# Patient Record
Sex: MALE | Race: WHITE
[De-identification: names, ages, dates, MRNs, and addresses within clinical notes are randomized per-mention and may not be internally consistent; named-entity substitution may affect disease eponyms.]

---

## 2020-06-16 ENCOUNTER — HOSPITAL ENCOUNTER (EMERGENCY)
Dept: HOSPITAL 18 - NAV ERS | Age: 70
Discharge: HOME | End: 2020-06-16
Payer: MEDICARE

## 2020-06-16 DIAGNOSIS — K57.32: Primary | ICD-10-CM

## 2020-06-16 DIAGNOSIS — I11.0: ICD-10-CM

## 2020-06-16 DIAGNOSIS — J44.9: ICD-10-CM

## 2020-06-16 DIAGNOSIS — E78.5: ICD-10-CM

## 2020-06-16 DIAGNOSIS — Z87.891: ICD-10-CM

## 2020-06-16 DIAGNOSIS — Z79.51: ICD-10-CM

## 2020-06-16 DIAGNOSIS — E78.00: ICD-10-CM

## 2020-06-16 DIAGNOSIS — I50.9: ICD-10-CM

## 2020-06-16 DIAGNOSIS — I25.10: ICD-10-CM

## 2020-06-16 DIAGNOSIS — Z79.899: ICD-10-CM

## 2020-06-16 LAB
ALBUMIN SERPL BCG-MCNC: 4.5 G/DL (ref 3.4–4.8)
ALP SERPL-CCNC: 92 U/L (ref 40–110)
ALT SERPL W P-5'-P-CCNC: 20 U/L (ref 8–55)
ANION GAP SERPL CALC-SCNC: 16 MMOL/L (ref 10–20)
AST SERPL-CCNC: 17 U/L (ref 5–34)
BASOPHILS # BLD AUTO: 0.1 THOU/UL (ref 0–0.2)
BASOPHILS NFR BLD AUTO: 0.9 % (ref 0–1)
BILIRUB SERPL-MCNC: 0.4 MG/DL (ref 0.2–1.2)
BUN SERPL-MCNC: 27 MG/DL (ref 8.4–25.7)
CALCIUM SERPL-MCNC: 9.5 MG/DL (ref 7.8–10.44)
CHLORIDE SERPL-SCNC: 105 MMOL/L (ref 98–107)
CO2 SERPL-SCNC: 24 MMOL/L (ref 23–31)
CREAT CL PREDICTED SERPL C-G-VRATE: 0 ML/MIN (ref 70–130)
EOSINOPHIL # BLD AUTO: 0.3 THOU/UL (ref 0–0.7)
EOSINOPHIL NFR BLD AUTO: 1.7 % (ref 0–10)
GLOBULIN SER CALC-MCNC: 3.1 G/DL (ref 2.4–3.5)
GLUCOSE SERPL-MCNC: 123 MG/DL (ref 80–115)
HGB BLD-MCNC: 12.1 G/DL (ref 14–18)
LYMPHOCYTES # BLD AUTO: 2.6 THOU/UL (ref 1.2–3.4)
LYMPHOCYTES NFR BLD AUTO: 16.1 % (ref 21–51)
MCH RBC QN AUTO: 30.4 PG (ref 27–31)
MCV RBC AUTO: 94.4 FL (ref 78–98)
MONOCYTES # BLD AUTO: 0.9 THOU/UL (ref 0.11–0.59)
MONOCYTES NFR BLD AUTO: 5.4 % (ref 0–10)
NEUTROPHILS # BLD AUTO: 12.2 THOU/UL (ref 1.4–6.5)
NEUTROPHILS NFR BLD AUTO: 75.9 % (ref 42–75)
PLATELET # BLD AUTO: 345 THOU/UL (ref 130–400)
POTASSIUM SERPL-SCNC: 5.2 MMOL/L (ref 3.5–5.1)
RBC # BLD AUTO: 3.97 MILL/UL (ref 4.7–6.1)
SODIUM SERPL-SCNC: 140 MMOL/L (ref 136–145)
WBC # BLD AUTO: 16.1 THOU/UL (ref 4.8–10.8)

## 2020-06-16 PROCEDURE — 85025 COMPLETE CBC W/AUTO DIFF WBC: CPT

## 2020-06-16 PROCEDURE — 80053 COMPREHEN METABOLIC PANEL: CPT

## 2020-06-16 PROCEDURE — 74177 CT ABD & PELVIS W/CONTRAST: CPT

## 2020-06-16 PROCEDURE — 96374 THER/PROPH/DIAG INJ IV PUSH: CPT

## 2020-06-16 PROCEDURE — 96375 TX/PRO/DX INJ NEW DRUG ADDON: CPT

## 2020-06-16 PROCEDURE — 96376 TX/PRO/DX INJ SAME DRUG ADON: CPT

## 2020-06-16 PROCEDURE — 83605 ASSAY OF LACTIC ACID: CPT

## 2020-06-16 PROCEDURE — 81003 URINALYSIS AUTO W/O SCOPE: CPT

## 2020-06-16 NOTE — CT
CT ABDOMEN AND PELVIS WITH IV CONTRAST

 6/16/2020



CLINICAL INFORMATION:

Lower abdominal pain which started 3 days ago. Pain radiates to low back.



COMPARISON:

 None.



Technique:

Multiple contiguous axial CT images are obtained through the abdomen and pelvis with IV contrast. Cor
onal reformatted images are provided.



FINDINGS:



Lower Chest: Lung bases are clear.

Vessels: Vascular calcifications are seen in the coronary arteries as well as involving the abdominal
 aorta and iliac arteries. Infrarenal abdominal aorta is ectatic measuring 2.9 cm in diameter. 



Abdomen:

Portal vein:Patent

Gallbladder: Within normal limits for CT imaging.

Liver: Subcentimeter too small to characterize hypodense lesions are seen in the left hepatic lobe. L
argest lobulated hypodense lesion near the dome of the liver measures 2.1 cm and is likely related

to a cyst.

Spleen: within normal limits.

Pancreas: within normal limits.

Adrenals: within normal limits.

Kidneys: Subcentimeter too small to characterize hypodense lesion is seen in the midportion right kid
royce. There is an exophytic hypodense lesion midportion left kidney measuring 1.6 cm demonstrating

fluid attenuation suggestive of a cyst.



Bowel: There is colonic diverticulosis. There is wall thickening and adjacent pericolonic inflammator
y changes at the junction of the descending and proximal sigmoid colon most compatible with

diverticulitis. No definite free intraperitoneal gas is present. There is no fluid collection seen to
 suggest an abscess.

Appendix: The appendix is visualized and normal in caliber.





Peritoneum: No ascites or free air; no fluid collection.

Mesentery and Retroperitoneum: No enlarged mesenteric or retroperitoneal lymph nodes.

Abdominal Wall: Small fat-containing right inguinal hernia. There is a fat density lesion seen involv
ing the left lateral abdominal oblique musculature measuring 5.8 cm likely related to a small

intramuscular lipoma.



Pelvis:

Reproductive Organs: No pelvic masses.

Pelvis within normal limits.

Bladder: within normal limits.



Bones: No suspicious lytic or sclerotic osseous lesions are identified.  



IMPRESSION:



Diverticulitis involving the junction of the descending colon and sigmoid colon. Follow-up evaluation
 is recommended to ensure resolution of colonic wall thickening.



Reported By: Urbano Pérez 

Electronically Signed:  6/16/2020 9:59 PM

## 2023-04-23 ENCOUNTER — HOSPITAL ENCOUNTER (INPATIENT)
Dept: HOSPITAL 92 - ERS | Age: 73
LOS: 10 days | Discharge: HOME | DRG: 226 | End: 2023-05-03
Attending: FAMILY MEDICINE | Admitting: STUDENT IN AN ORGANIZED HEALTH CARE EDUCATION/TRAINING PROGRAM
Payer: MEDICARE

## 2023-04-23 ENCOUNTER — HOSPITAL ENCOUNTER (EMERGENCY)
Dept: HOSPITAL 18 - NAV ERS | Age: 73
Discharge: ANOTHER HOSPITAL | End: 2023-04-23
Payer: MEDICARE

## 2023-04-23 VITALS — BODY MASS INDEX: 24 KG/M2

## 2023-04-23 DIAGNOSIS — Z79.899: ICD-10-CM

## 2023-04-23 DIAGNOSIS — J44.0: ICD-10-CM

## 2023-04-23 DIAGNOSIS — Z87.891: ICD-10-CM

## 2023-04-23 DIAGNOSIS — I50.9: ICD-10-CM

## 2023-04-23 DIAGNOSIS — E78.00: ICD-10-CM

## 2023-04-23 DIAGNOSIS — E87.20: ICD-10-CM

## 2023-04-23 DIAGNOSIS — J44.9: ICD-10-CM

## 2023-04-23 DIAGNOSIS — I25.10: ICD-10-CM

## 2023-04-23 DIAGNOSIS — I11.0: ICD-10-CM

## 2023-04-23 DIAGNOSIS — I21.A1: ICD-10-CM

## 2023-04-23 DIAGNOSIS — K59.00: ICD-10-CM

## 2023-04-23 DIAGNOSIS — I48.19: ICD-10-CM

## 2023-04-23 DIAGNOSIS — J90: ICD-10-CM

## 2023-04-23 DIAGNOSIS — J96.21: ICD-10-CM

## 2023-04-23 DIAGNOSIS — Z79.01: ICD-10-CM

## 2023-04-23 DIAGNOSIS — I13.0: Primary | ICD-10-CM

## 2023-04-23 DIAGNOSIS — I73.9: ICD-10-CM

## 2023-04-23 DIAGNOSIS — I47.29: ICD-10-CM

## 2023-04-23 DIAGNOSIS — I50.23: ICD-10-CM

## 2023-04-23 DIAGNOSIS — R79.1: ICD-10-CM

## 2023-04-23 DIAGNOSIS — Z20.822: ICD-10-CM

## 2023-04-23 DIAGNOSIS — J44.1: ICD-10-CM

## 2023-04-23 DIAGNOSIS — N17.9: ICD-10-CM

## 2023-04-23 DIAGNOSIS — I42.8: ICD-10-CM

## 2023-04-23 DIAGNOSIS — I25.5: ICD-10-CM

## 2023-04-23 DIAGNOSIS — I16.1: ICD-10-CM

## 2023-04-23 DIAGNOSIS — N18.9: ICD-10-CM

## 2023-04-23 DIAGNOSIS — F41.9: ICD-10-CM

## 2023-04-23 DIAGNOSIS — J18.9: ICD-10-CM

## 2023-04-23 DIAGNOSIS — J18.9: Primary | ICD-10-CM

## 2023-04-23 DIAGNOSIS — E66.9: ICD-10-CM

## 2023-04-23 LAB
ALBUMIN SERPL BCG-MCNC: 4.6 G/DL (ref 3.4–4.8)
ALBUMIN SERPL BCG-MCNC: 4.6 G/DL (ref 3.4–4.8)
ALP SERPL-CCNC: 87 U/L (ref 40–110)
ALP SERPL-CCNC: 90 U/L (ref 40–110)
ALT SERPL W P-5'-P-CCNC: 11 U/L (ref 8–55)
ALT SERPL W P-5'-P-CCNC: 12 U/L (ref 8–55)
ANALYZER IN CARDIO: (no result)
ANION GAP SERPL CALC-SCNC: 18 MMOL/L (ref 10–20)
ANION GAP SERPL CALC-SCNC: 21 MMOL/L (ref 10–20)
ANISOCYTOSIS BLD QL SMEAR: (no result) (100X)
APTT PPP: 33 SEC (ref 22.9–36.1)
AST SERPL-CCNC: 14 U/L (ref 5–34)
AST SERPL-CCNC: 15 U/L (ref 5–34)
BASE EXCESS STD BLDA CALC-SCNC: -1 MEQ/L
BASOPHILS # BLD AUTO: 0 THOU/UL (ref 0–0.2)
BASOPHILS NFR BLD AUTO: 0.2 % (ref 0–1)
BILIRUB SERPL-MCNC: 0.6 MG/DL (ref 0.2–1.2)
BILIRUB SERPL-MCNC: 0.7 MG/DL (ref 0.2–1.2)
BUN SERPL-MCNC: 27 MG/DL (ref 8.4–25.7)
BUN SERPL-MCNC: 27 MG/DL (ref 8.4–25.7)
CA-I BLDA-SCNC: 1.19 MMOL/L (ref 1.12–1.3)
CALCIUM SERPL-MCNC: 9.7 MG/DL (ref 7.8–10.44)
CALCIUM SERPL-MCNC: 9.9 MG/DL (ref 7.8–10.44)
CHLORIDE SERPL-SCNC: 100 MMOL/L (ref 98–107)
CHLORIDE SERPL-SCNC: 103 MMOL/L (ref 98–107)
CO2 SERPL-SCNC: 21 MMOL/L (ref 23–31)
CO2 SERPL-SCNC: 22 MMOL/L (ref 23–31)
CREAT CL PREDICTED SERPL C-G-VRATE: 0 ML/MIN (ref 70–130)
CREAT CL PREDICTED SERPL C-G-VRATE: 0 ML/MIN (ref 70–130)
D DIMER PPP FEU-MCNC: 3.03 *MCG/ML (ref 0.27–0.43)
EOSINOPHIL # BLD AUTO: 0 THOU/UL (ref 0–0.7)
EOSINOPHIL NFR BLD AUTO: 0.1 % (ref 0–10)
GLOBULIN SER CALC-MCNC: 3 G/DL (ref 2.4–3.5)
GLOBULIN SER CALC-MCNC: 3.7 G/DL (ref 2.4–3.5)
GLUCOSE SERPL-MCNC: 118 MG/DL (ref 80–115)
GLUCOSE SERPL-MCNC: 147 MG/DL (ref 80–115)
HCO3 BLDA-SCNC: 24.8 MEQ/L (ref 22–28)
HCT VFR BLDA CALC: 47 % (ref 42–52)
HGB BLD-MCNC: 14.7 G/DL (ref 14–18)
HGB BLD-MCNC: 15.7 G/DL (ref 14–18)
HGB BLDA-MCNC: 15.9 G/DL (ref 14–18)
INR PPP: 1.1
LYMPHOCYTES # BLD: 0.5 THOU/UL (ref 1.2–3.4)
LYMPHOCYTES NFR BLD AUTO: 3.1 % (ref 21–51)
MAGNESIUM SERPL-MCNC: 2.6 MG/DL (ref 1.6–2.6)
MCH RBC QN AUTO: 29.9 PG (ref 27–31)
MCH RBC QN AUTO: 31.2 PG (ref 27–31)
MCV RBC AUTO: 92.6 FL (ref 78–98)
MCV RBC AUTO: 97.1 FL (ref 78–98)
MDIFF COMPLETE?: YES
MONOCYTES # BLD AUTO: 0.6 THOU/UL (ref 0.11–0.59)
MONOCYTES NFR BLD AUTO: 3.6 % (ref 0–10)
NEUTROPHILS # BLD AUTO: 14.9 THOU/UL (ref 1.4–6.5)
NEUTROPHILS NFR BLD AUTO: 93 % (ref 42–75)
O2 A-A PPRESDIFF RESPIRATORY: 316.23 MMHG (ref 0–20)
PCO2 BLDA: 45.1 MMHG (ref 35–45)
PH BLDA: 7.36 [PH] (ref 7.35–7.45)
PLATELET # BLD AUTO: 402 10X3/UL (ref 130–400)
PLATELET # BLD AUTO: 423 10X3/UL (ref 130–400)
PO2 BLDA: 55.2 MMHG (ref 70–?)
POTASSIUM BLD-SCNC: 4.12 MMOL/L (ref 3.7–5.3)
POTASSIUM SERPL-SCNC: 4.1 MMOL/L (ref 3.5–5.1)
POTASSIUM SERPL-SCNC: 4.4 MMOL/L (ref 3.5–5.1)
PROT UR STRIP.AUTO-MCNC: 50 MG/DL
PROTHROMBIN TIME: 15 SEC (ref 12–14.7)
RBC # BLD AUTO: 4.93 MILL/UL (ref 4.7–6.1)
RBC # BLD AUTO: 5.03 MILL/UL (ref 4.7–6.1)
RBC UR QL AUTO: (no result) HPF (ref 0–3)
SODIUM SERPL-SCNC: 138 MMOL/L (ref 136–145)
SODIUM SERPL-SCNC: 139 MMOL/L (ref 136–145)
SP GR UR STRIP: 1.02 (ref 1–1.04)
SPECIMEN DRAWN FROM PATIENT: (no result)
TROPONIN I SERPL DL<=0.01 NG/ML-MCNC: 0.03 NG/ML (ref ?–0.03)
WBC # BLD AUTO: 16 10X3/UL (ref 4.8–10.8)
WBC # BLD AUTO: 23.4 10X3/UL (ref 4.8–10.8)
WBC UR QL AUTO: (no result) HPF (ref 0–3)

## 2023-04-23 PROCEDURE — 80053 COMPREHEN METABOLIC PANEL: CPT

## 2023-04-23 PROCEDURE — 85379 FIBRIN DEGRADATION QUANT: CPT

## 2023-04-23 PROCEDURE — 93623 PRGRMD STIMJ&PACG IV RX NFS: CPT

## 2023-04-23 PROCEDURE — 83735 ASSAY OF MAGNESIUM: CPT

## 2023-04-23 PROCEDURE — 96365 THER/PROPH/DIAG IV INF INIT: CPT

## 2023-04-23 PROCEDURE — C1882 AICD, OTHER THAN SING/DUAL: HCPCS

## 2023-04-23 PROCEDURE — 85610 PROTHROMBIN TIME: CPT

## 2023-04-23 PROCEDURE — 84484 ASSAY OF TROPONIN QUANT: CPT

## 2023-04-23 PROCEDURE — 96375 TX/PRO/DX INJ NEW DRUG ADDON: CPT

## 2023-04-23 PROCEDURE — 85025 COMPLETE CBC W/AUTO DIFF WBC: CPT

## 2023-04-23 PROCEDURE — 36416 COLLJ CAPILLARY BLOOD SPEC: CPT

## 2023-04-23 PROCEDURE — 33249 INSJ/RPLCMT DEFIB W/LEAD(S): CPT

## 2023-04-23 PROCEDURE — 87149 DNA/RNA DIRECT PROBE: CPT

## 2023-04-23 PROCEDURE — 80048 BASIC METABOLIC PNL TOTAL CA: CPT

## 2023-04-23 PROCEDURE — 86901 BLOOD TYPING SEROLOGIC RH(D): CPT

## 2023-04-23 PROCEDURE — 87040 BLOOD CULTURE FOR BACTERIA: CPT

## 2023-04-23 PROCEDURE — 83880 ASSAY OF NATRIURETIC PEPTIDE: CPT

## 2023-04-23 PROCEDURE — 36600 WITHDRAWAL OF ARTERIAL BLOOD: CPT

## 2023-04-23 PROCEDURE — C1763 CONN TISS, NON-HUMAN: HCPCS

## 2023-04-23 PROCEDURE — C1732 CATH, EP, DIAG/ABL, 3D/VECT: HCPCS

## 2023-04-23 PROCEDURE — 84443 ASSAY THYROID STIM HORMONE: CPT

## 2023-04-23 PROCEDURE — 82805 BLOOD GASES W/O2 SATURATION: CPT

## 2023-04-23 PROCEDURE — 86900 BLOOD TYPING SEROLOGIC ABO: CPT

## 2023-04-23 PROCEDURE — C1760 CLOSURE DEV, VASC: HCPCS

## 2023-04-23 PROCEDURE — 94760 N-INVAS EAR/PLS OXIMETRY 1: CPT

## 2023-04-23 PROCEDURE — 84145 PROCALCITONIN (PCT): CPT

## 2023-04-23 PROCEDURE — 81003 URINALYSIS AUTO W/O SCOPE: CPT

## 2023-04-23 PROCEDURE — 96368 THER/DIAG CONCURRENT INF: CPT

## 2023-04-23 PROCEDURE — 81015 MICROSCOPIC EXAM OF URINE: CPT

## 2023-04-23 PROCEDURE — 93005 ELECTROCARDIOGRAM TRACING: CPT

## 2023-04-23 PROCEDURE — 93010 ELECTROCARDIOGRAM REPORT: CPT

## 2023-04-23 PROCEDURE — 93650 ICAR CATH ABLTJ AV NODE FUNC: CPT

## 2023-04-23 PROCEDURE — 71045 X-RAY EXAM CHEST 1 VIEW: CPT

## 2023-04-23 PROCEDURE — 94660 CPAP INITIATION&MGMT: CPT

## 2023-04-23 PROCEDURE — 86850 RBC ANTIBODY SCREEN: CPT

## 2023-04-23 PROCEDURE — 83605 ASSAY OF LACTIC ACID: CPT

## 2023-04-23 PROCEDURE — C1894 INTRO/SHEATH, NON-LASER: HCPCS

## 2023-04-23 PROCEDURE — 36415 COLL VENOUS BLD VENIPUNCTURE: CPT

## 2023-04-23 PROCEDURE — 87077 CULTURE AEROBIC IDENTIFY: CPT

## 2023-04-23 PROCEDURE — C1900 LEAD, CORONARY VENOUS: HCPCS

## 2023-04-23 PROCEDURE — 33225 L VENTRIC PACING LEAD ADD-ON: CPT

## 2023-04-23 PROCEDURE — 93613 INTRACARDIAC EPHYS 3D MAPG: CPT

## 2023-04-23 PROCEDURE — 93306 TTE W/DOPPLER COMPLETE: CPT

## 2023-04-23 PROCEDURE — 87186 SC STD MICRODIL/AGAR DIL: CPT

## 2023-04-23 PROCEDURE — 0240U: CPT

## 2023-04-23 PROCEDURE — S0028 INJECTION, FAMOTIDINE, 20 MG: HCPCS

## 2023-04-23 PROCEDURE — C1895 LEAD, AICD, ENDO DUAL COIL: HCPCS

## 2023-04-23 PROCEDURE — 85730 THROMBOPLASTIN TIME PARTIAL: CPT

## 2023-04-23 PROCEDURE — C1898 LEAD, PMKR, OTHER THAN TRANS: HCPCS

## 2023-04-23 PROCEDURE — 51702 INSERT TEMP BLADDER CATH: CPT

## 2023-04-23 PROCEDURE — 93798 PHYS/QHP OP CAR RHAB W/ECG: CPT

## 2023-04-24 LAB
ALBUMIN SERPL BCG-MCNC: 4.1 G/DL (ref 3.4–4.8)
ALP SERPL-CCNC: 65 U/L (ref 40–110)
ALT SERPL W P-5'-P-CCNC: 11 U/L (ref 8–55)
ANION GAP SERPL CALC-SCNC: 23 MMOL/L (ref 10–20)
ANISOCYTOSIS BLD QL SMEAR: (no result) (100X)
AST SERPL-CCNC: 13 U/L (ref 5–34)
BILIRUB SERPL-MCNC: 0.9 MG/DL (ref 0.2–1.2)
BUN SERPL-MCNC: 31 MG/DL (ref 8.4–25.7)
CALCIUM SERPL-MCNC: 9.5 MG/DL (ref 7.8–10.44)
CHLORIDE SERPL-SCNC: 100 MMOL/L (ref 98–107)
CO2 SERPL-SCNC: 20 MMOL/L (ref 23–31)
CREAT CL PREDICTED SERPL C-G-VRATE: 45 ML/MIN (ref 70–130)
GLOBULIN SER CALC-MCNC: 3.3 G/DL (ref 2.4–3.5)
GLUCOSE SERPL-MCNC: 143 MG/DL (ref 80–115)
HGB BLD-MCNC: 15.1 G/DL (ref 14–18)
MCH RBC QN AUTO: 30.7 PG (ref 27–31)
MCV RBC AUTO: 94.8 FL (ref 78–98)
MDIFF COMPLETE?: YES
PLATELET # BLD AUTO: 327 10X3/UL (ref 130–400)
POLYCHROMASIA BLD QL SMEAR: (no result) (100X)
POTASSIUM SERPL-SCNC: 3.8 MMOL/L (ref 3.5–5.1)
RBC # BLD AUTO: 4.93 MILL/UL (ref 4.7–6.1)
SODIUM SERPL-SCNC: 139 MMOL/L (ref 136–145)
STOMATOCYTES BLD QL SMEAR: (no result) (100X)
TROPONIN I SERPL DL<=0.01 NG/ML-MCNC: 0.04 NG/ML (ref ?–0.03)
WBC # BLD AUTO: 9.9 10X3/UL (ref 4.8–10.8)

## 2023-04-24 RX ADMIN — DOCUSATE SODIUM 50 MG AND SENNOSIDES 8.6 MG PRN TAB: 8.6; 5 TABLET, FILM COATED ORAL at 05:55

## 2023-04-24 RX ADMIN — IPRATROPIUM BROMIDE AND ALBUTEROL PRN PUFF: 20; 100 SPRAY, METERED RESPIRATORY (INHALATION) at 21:52

## 2023-04-25 LAB
ANION GAP SERPL CALC-SCNC: 21 MMOL/L (ref 10–20)
BUN SERPL-MCNC: 48 MG/DL (ref 8.4–25.7)
CALCIUM SERPL-MCNC: 9 MG/DL (ref 7.8–10.44)
CHLORIDE SERPL-SCNC: 102 MMOL/L (ref 98–107)
CO2 SERPL-SCNC: 18 MMOL/L (ref 23–31)
CREAT CL PREDICTED SERPL C-G-VRATE: 54 ML/MIN (ref 70–130)
GLUCOSE SERPL-MCNC: 142 MG/DL (ref 80–115)
POTASSIUM SERPL-SCNC: 3.8 MMOL/L (ref 3.5–5.1)
SODIUM SERPL-SCNC: 137 MMOL/L (ref 136–145)

## 2023-04-25 PROCEDURE — 5A2204Z RESTORATION OF CARDIAC RHYTHM, SINGLE: ICD-10-PCS | Performed by: INTERNAL MEDICINE

## 2023-04-25 RX ADMIN — MOMETASONE FUROATE AND FORMOTEROL FUMARATE DIHYDRATE SCH PUFF: 100; 5 AEROSOL RESPIRATORY (INHALATION) at 19:18

## 2023-04-25 RX ADMIN — MOMETASONE FUROATE AND FORMOTEROL FUMARATE DIHYDRATE SCH PUFF: 100; 5 AEROSOL RESPIRATORY (INHALATION) at 07:51

## 2023-04-25 RX ADMIN — AMIODARONE HYDROCHLORIDE SCH MLS: 50 INJECTION, SOLUTION INTRAVENOUS at 16:37

## 2023-04-25 RX ADMIN — AMIODARONE HYDROCHLORIDE SCH MLS: 50 INJECTION, SOLUTION INTRAVENOUS at 09:30

## 2023-04-25 RX ADMIN — IPRATROPIUM BROMIDE AND ALBUTEROL PRN PUFF: 20; 100 SPRAY, METERED RESPIRATORY (INHALATION) at 22:07

## 2023-04-25 RX ADMIN — IPRATROPIUM BROMIDE AND ALBUTEROL PRN PUFF: 20; 100 SPRAY, METERED RESPIRATORY (INHALATION) at 08:00

## 2023-04-26 LAB
ANION GAP SERPL CALC-SCNC: 14 MMOL/L (ref 10–20)
ANISOCYTOSIS BLD QL SMEAR: (no result) (100X)
BUN SERPL-MCNC: 40 MG/DL (ref 8.4–25.7)
CALCIUM SERPL-MCNC: 9.4 MG/DL (ref 7.8–10.44)
CHLORIDE SERPL-SCNC: 100 MMOL/L (ref 98–107)
CO2 SERPL-SCNC: 27 MMOL/L (ref 23–31)
CREAT CL PREDICTED SERPL C-G-VRATE: 78 ML/MIN (ref 70–130)
GLUCOSE SERPL-MCNC: 149 MG/DL (ref 80–115)
HGB BLD-MCNC: 12.6 G/DL (ref 14–18)
MCH RBC QN AUTO: 32 PG (ref 27–31)
MCV RBC AUTO: 94.7 FL (ref 78–98)
MDIFF COMPLETE?: YES
PLATELET # BLD AUTO: 251 10X3/UL (ref 130–400)
POLYCHROMASIA BLD QL SMEAR: (no result) (100X)
POTASSIUM SERPL-SCNC: 4 MMOL/L (ref 3.5–5.1)
RBC # BLD AUTO: 3.94 MILL/UL (ref 4.7–6.1)
SODIUM SERPL-SCNC: 137 MMOL/L (ref 136–145)
WBC # BLD AUTO: 18.2 10X3/UL (ref 4.8–10.8)

## 2023-04-26 PROCEDURE — 5A2204Z RESTORATION OF CARDIAC RHYTHM, SINGLE: ICD-10-PCS | Performed by: INTERNAL MEDICINE

## 2023-04-26 RX ADMIN — AMIODARONE HYDROCHLORIDE SCH MLS: 50 INJECTION, SOLUTION INTRAVENOUS at 04:56

## 2023-04-26 RX ADMIN — MOMETASONE FUROATE AND FORMOTEROL FUMARATE DIHYDRATE SCH PUFF: 100; 5 AEROSOL RESPIRATORY (INHALATION) at 18:10

## 2023-04-26 RX ADMIN — MOMETASONE FUROATE AND FORMOTEROL FUMARATE DIHYDRATE SCH PUFF: 100; 5 AEROSOL RESPIRATORY (INHALATION) at 08:20

## 2023-04-26 RX ADMIN — SACUBITRIL AND VALSARTAN SCH TAB: 24; 26 TABLET, FILM COATED ORAL at 21:40

## 2023-04-26 RX ADMIN — IPRATROPIUM BROMIDE AND ALBUTEROL PRN PUFF: 20; 100 SPRAY, METERED RESPIRATORY (INHALATION) at 21:44

## 2023-04-26 RX ADMIN — SACUBITRIL AND VALSARTAN SCH TAB: 24; 26 TABLET, FILM COATED ORAL at 09:37

## 2023-04-27 LAB
ANION GAP SERPL CALC-SCNC: 11 MMOL/L (ref 10–20)
ANION GAP SERPL CALC-SCNC: 16 MMOL/L (ref 10–20)
APTT PPP: 38.2 SEC (ref 22.9–36.1)
BUN SERPL-MCNC: 38 MG/DL (ref 8.4–25.7)
BUN SERPL-MCNC: 41 MG/DL (ref 8.4–25.7)
CALCIUM SERPL-MCNC: 8.6 MG/DL (ref 7.8–10.44)
CALCIUM SERPL-MCNC: 9.7 MG/DL (ref 7.8–10.44)
CHLORIDE SERPL-SCNC: 101 MMOL/L (ref 98–107)
CHLORIDE SERPL-SCNC: 95 MMOL/L (ref 98–107)
CO2 SERPL-SCNC: 22 MMOL/L (ref 23–31)
CO2 SERPL-SCNC: 26 MMOL/L (ref 23–31)
CREAT CL PREDICTED SERPL C-G-VRATE: 70 ML/MIN (ref 70–130)
CREAT CL PREDICTED SERPL C-G-VRATE: 70 ML/MIN (ref 70–130)
GLUCOSE SERPL-MCNC: 186 MG/DL (ref 80–115)
GLUCOSE SERPL-MCNC: 427 MG/DL (ref 80–115)
HGB BLD-MCNC: 12.1 G/DL (ref 14–18)
INR PPP: 1.2
MAGNESIUM SERPL-MCNC: 2.2 MG/DL (ref 1.6–2.6)
MCH RBC QN AUTO: 31.5 PG (ref 27–31)
MCV RBC AUTO: 95.4 FL (ref 78–98)
MDIFF COMPLETE?: YES
PLATELET # BLD AUTO: 259 10X3/UL (ref 130–400)
POTASSIUM SERPL-SCNC: 3.7 MMOL/L (ref 3.5–5.1)
POTASSIUM SERPL-SCNC: 4 MMOL/L (ref 3.5–5.1)
PROTHROMBIN TIME: 15.5 SEC (ref 12–14.7)
RBC # BLD AUTO: 3.83 MILL/UL (ref 4.7–6.1)
SODIUM SERPL-SCNC: 128 MMOL/L (ref 136–145)
SODIUM SERPL-SCNC: 135 MMOL/L (ref 136–145)
WBC # BLD AUTO: 19.4 10X3/UL (ref 4.8–10.8)

## 2023-04-27 RX ADMIN — IPRATROPIUM BROMIDE SCH PUFF: 17 AEROSOL, METERED RESPIRATORY (INHALATION) at 10:51

## 2023-04-27 RX ADMIN — AMIODARONE HYDROCHLORIDE SCH MLS: 50 INJECTION, SOLUTION INTRAVENOUS at 01:21

## 2023-04-27 RX ADMIN — IPRATROPIUM BROMIDE SCH PUFF: 17 AEROSOL, METERED RESPIRATORY (INHALATION) at 18:55

## 2023-04-27 RX ADMIN — IPRATROPIUM BROMIDE SCH PUFF: 17 AEROSOL, METERED RESPIRATORY (INHALATION) at 15:40

## 2023-04-27 RX ADMIN — MOMETASONE FUROATE AND FORMOTEROL FUMARATE DIHYDRATE SCH PUFF: 100; 5 AEROSOL RESPIRATORY (INHALATION) at 08:29

## 2023-04-27 RX ADMIN — SACUBITRIL AND VALSARTAN SCH TAB: 24; 26 TABLET, FILM COATED ORAL at 09:17

## 2023-04-27 RX ADMIN — SACUBITRIL AND VALSARTAN SCH TAB: 24; 26 TABLET, FILM COATED ORAL at 21:19

## 2023-04-27 RX ADMIN — DOCUSATE SODIUM 50 MG AND SENNOSIDES 8.6 MG PRN TAB: 8.6; 5 TABLET, FILM COATED ORAL at 16:28

## 2023-04-28 LAB
ANION GAP SERPL CALC-SCNC: 14 MMOL/L (ref 10–20)
BUN SERPL-MCNC: 43 MG/DL (ref 8.4–25.7)
CALCIUM SERPL-MCNC: 9.2 MG/DL (ref 7.8–10.44)
CHLORIDE SERPL-SCNC: 103 MMOL/L (ref 98–107)
CO2 SERPL-SCNC: 25 MMOL/L (ref 23–31)
CREAT CL PREDICTED SERPL C-G-VRATE: 72 ML/MIN (ref 70–130)
GLUCOSE SERPL-MCNC: 182 MG/DL (ref 80–115)
HGB BLD-MCNC: 13.5 G/DL (ref 14–18)
MAGNESIUM SERPL-MCNC: 2.5 MG/DL (ref 1.6–2.6)
MCH RBC QN AUTO: 32.4 PG (ref 27–31)
MCV RBC AUTO: 93.8 FL (ref 78–98)
MDIFF COMPLETE?: YES
PLATELET # BLD AUTO: 339 10X3/UL (ref 130–400)
POTASSIUM SERPL-SCNC: 4.1 MMOL/L (ref 3.5–5.1)
RBC # BLD AUTO: 4.17 MILL/UL (ref 4.7–6.1)
SODIUM SERPL-SCNC: 138 MMOL/L (ref 136–145)
WBC # BLD AUTO: 16.4 10X3/UL (ref 4.8–10.8)

## 2023-04-28 RX ADMIN — Medication SCH ML: at 21:10

## 2023-04-28 RX ADMIN — SACUBITRIL AND VALSARTAN SCH TAB: 24; 26 TABLET, FILM COATED ORAL at 21:08

## 2023-04-28 RX ADMIN — IPRATROPIUM BROMIDE SCH PUFF: 17 AEROSOL, METERED RESPIRATORY (INHALATION) at 14:04

## 2023-04-28 RX ADMIN — SACUBITRIL AND VALSARTAN SCH TAB: 24; 26 TABLET, FILM COATED ORAL at 09:20

## 2023-04-28 RX ADMIN — MOMETASONE FUROATE AND FORMOTEROL FUMARATE DIHYDRATE SCH PUFF: 200; 5 AEROSOL RESPIRATORY (INHALATION) at 19:18

## 2023-04-28 RX ADMIN — IPRATROPIUM BROMIDE SCH PUFF: 17 AEROSOL, METERED RESPIRATORY (INHALATION) at 07:00

## 2023-04-28 RX ADMIN — IPRATROPIUM BROMIDE SCH PUFF: 17 AEROSOL, METERED RESPIRATORY (INHALATION) at 19:16

## 2023-04-28 RX ADMIN — IPRATROPIUM BROMIDE SCH PUFF: 17 AEROSOL, METERED RESPIRATORY (INHALATION) at 10:42

## 2023-04-28 RX ADMIN — DOCUSATE SODIUM 50 MG AND SENNOSIDES 8.6 MG PRN TAB: 8.6; 5 TABLET, FILM COATED ORAL at 17:13

## 2023-04-29 LAB
ANION GAP SERPL CALC-SCNC: 13 MMOL/L (ref 10–20)
ANISOCYTOSIS BLD QL SMEAR: (no result) (100X)
BUN SERPL-MCNC: 43 MG/DL (ref 8.4–25.7)
CALCIUM SERPL-MCNC: 9.1 MG/DL (ref 7.8–10.44)
CHLORIDE SERPL-SCNC: 104 MMOL/L (ref 98–107)
CO2 SERPL-SCNC: 28 MMOL/L (ref 23–31)
CREAT CL PREDICTED SERPL C-G-VRATE: 79 ML/MIN (ref 70–130)
GLUCOSE SERPL-MCNC: 132 MG/DL (ref 80–115)
HGB BLD-MCNC: 13.6 G/DL (ref 14–18)
MAGNESIUM SERPL-MCNC: 2.6 MG/DL (ref 1.6–2.6)
MCH RBC QN AUTO: 31.4 PG (ref 27–31)
MCV RBC AUTO: 95.3 FL (ref 78–98)
MDIFF COMPLETE?: YES
PLATELET # BLD AUTO: 360 10X3/UL (ref 130–400)
POTASSIUM SERPL-SCNC: 4 MMOL/L (ref 3.5–5.1)
RBC # BLD AUTO: 4.33 MILL/UL (ref 4.7–6.1)
SODIUM SERPL-SCNC: 141 MMOL/L (ref 136–145)
WBC # BLD AUTO: 18.4 10X3/UL (ref 4.8–10.8)

## 2023-04-29 RX ADMIN — IPRATROPIUM BROMIDE SCH: 17 AEROSOL, METERED RESPIRATORY (INHALATION) at 10:10

## 2023-04-29 RX ADMIN — DOCUSATE SODIUM 50 MG AND SENNOSIDES 8.6 MG PRN TAB: 8.6; 5 TABLET, FILM COATED ORAL at 05:58

## 2023-04-29 RX ADMIN — SACUBITRIL AND VALSARTAN SCH TAB: 24; 26 TABLET, FILM COATED ORAL at 20:58

## 2023-04-29 RX ADMIN — IPRATROPIUM BROMIDE SCH: 17 AEROSOL, METERED RESPIRATORY (INHALATION) at 18:11

## 2023-04-29 RX ADMIN — MOMETASONE FUROATE AND FORMOTEROL FUMARATE DIHYDRATE SCH PUFF: 200; 5 AEROSOL RESPIRATORY (INHALATION) at 18:51

## 2023-04-29 RX ADMIN — SACUBITRIL AND VALSARTAN SCH TAB: 24; 26 TABLET, FILM COATED ORAL at 08:23

## 2023-04-29 RX ADMIN — IPRATROPIUM BROMIDE SCH PUFF: 17 AEROSOL, METERED RESPIRATORY (INHALATION) at 18:49

## 2023-04-29 RX ADMIN — IPRATROPIUM BROMIDE SCH PUFF: 17 AEROSOL, METERED RESPIRATORY (INHALATION) at 10:10

## 2023-04-29 RX ADMIN — MOMETASONE FUROATE AND FORMOTEROL FUMARATE DIHYDRATE SCH PUFF: 200; 5 AEROSOL RESPIRATORY (INHALATION) at 10:09

## 2023-04-29 RX ADMIN — Medication SCH ML: at 08:25

## 2023-04-29 RX ADMIN — Medication SCH ML: at 21:02

## 2023-04-30 LAB
ANION GAP SERPL CALC-SCNC: 11 MMOL/L (ref 10–20)
BASOPHILS # BLD AUTO: 0 THOU/UL (ref 0–0.2)
BASOPHILS NFR BLD AUTO: 0.2 % (ref 0–1)
BUN SERPL-MCNC: 38 MG/DL (ref 8.4–25.7)
CALCIUM SERPL-MCNC: 8.6 MG/DL (ref 7.8–10.44)
CHLORIDE SERPL-SCNC: 103 MMOL/L (ref 98–107)
CO2 SERPL-SCNC: 29 MMOL/L (ref 23–31)
CREAT CL PREDICTED SERPL C-G-VRATE: 71 ML/MIN (ref 70–130)
EOSINOPHIL # BLD AUTO: 0.3 THOU/UL (ref 0–0.7)
EOSINOPHIL NFR BLD AUTO: 2.1 % (ref 0–10)
GLUCOSE SERPL-MCNC: 136 MG/DL (ref 80–115)
HGB BLD-MCNC: 14 G/DL (ref 14–18)
LYMPHOCYTES # BLD: 1.2 THOU/UL (ref 1.2–3.4)
LYMPHOCYTES NFR BLD AUTO: 8 % (ref 21–51)
MAGNESIUM SERPL-MCNC: 2.3 MG/DL (ref 1.6–2.6)
MCH RBC QN AUTO: 31.5 PG (ref 27–31)
MCV RBC AUTO: 95.8 FL (ref 78–98)
MONOCYTES # BLD AUTO: 1.3 THOU/UL (ref 0.11–0.59)
MONOCYTES NFR BLD AUTO: 8.9 % (ref 0–10)
NEUTROPHILS # BLD AUTO: 11.8 THOU/UL (ref 1.4–6.5)
NEUTROPHILS NFR BLD AUTO: 80.9 % (ref 42–75)
PLATELET # BLD AUTO: 414 10X3/UL (ref 130–400)
POTASSIUM SERPL-SCNC: 3.6 MMOL/L (ref 3.5–5.1)
RBC # BLD AUTO: 4.45 MILL/UL (ref 4.7–6.1)
SODIUM SERPL-SCNC: 139 MMOL/L (ref 136–145)
WBC # BLD AUTO: 14.6 10X3/UL (ref 4.8–10.8)

## 2023-04-30 RX ADMIN — IPRATROPIUM BROMIDE SCH: 17 AEROSOL, METERED RESPIRATORY (INHALATION) at 11:00

## 2023-04-30 RX ADMIN — IPRATROPIUM BROMIDE SCH PUFF: 17 AEROSOL, METERED RESPIRATORY (INHALATION) at 14:00

## 2023-04-30 RX ADMIN — IPRATROPIUM BROMIDE SCH PUFF: 17 AEROSOL, METERED RESPIRATORY (INHALATION) at 06:53

## 2023-04-30 RX ADMIN — Medication SCH: at 09:26

## 2023-04-30 RX ADMIN — Medication SCH ML: at 20:38

## 2023-04-30 RX ADMIN — SACUBITRIL AND VALSARTAN SCH TAB: 24; 26 TABLET, FILM COATED ORAL at 20:24

## 2023-04-30 RX ADMIN — MOMETASONE FUROATE AND FORMOTEROL FUMARATE DIHYDRATE SCH PUFF: 200; 5 AEROSOL RESPIRATORY (INHALATION) at 19:36

## 2023-04-30 RX ADMIN — SACUBITRIL AND VALSARTAN SCH TAB: 24; 26 TABLET, FILM COATED ORAL at 08:15

## 2023-04-30 RX ADMIN — MOMETASONE FUROATE AND FORMOTEROL FUMARATE DIHYDRATE SCH PUFF: 200; 5 AEROSOL RESPIRATORY (INHALATION) at 06:52

## 2023-04-30 RX ADMIN — IPRATROPIUM BROMIDE SCH PUFF: 17 AEROSOL, METERED RESPIRATORY (INHALATION) at 19:34

## 2023-05-01 LAB
ANION GAP SERPL CALC-SCNC: 13 MMOL/L (ref 10–20)
ANISOCYTOSIS BLD QL SMEAR: (no result) (100X)
BUN SERPL-MCNC: 31 MG/DL (ref 8.4–25.7)
CALCIUM SERPL-MCNC: 8.7 MG/DL (ref 7.8–10.44)
CHLORIDE SERPL-SCNC: 103 MMOL/L (ref 98–107)
CO2 SERPL-SCNC: 27 MMOL/L (ref 23–31)
CREAT CL PREDICTED SERPL C-G-VRATE: 84 ML/MIN (ref 70–130)
GLUCOSE SERPL-MCNC: 98 MG/DL (ref 80–115)
HGB BLD-MCNC: 13 G/DL (ref 14–18)
MAGNESIUM SERPL-MCNC: 2.3 MG/DL (ref 1.6–2.6)
MCH RBC QN AUTO: 31.6 PG (ref 27–31)
MCV RBC AUTO: 94.2 FL (ref 78–98)
MDIFF COMPLETE?: YES
PLATELET # BLD AUTO: 401 10X3/UL (ref 130–400)
POTASSIUM SERPL-SCNC: 3.9 MMOL/L (ref 3.5–5.1)
RBC # BLD AUTO: 4.12 MILL/UL (ref 4.7–6.1)
SODIUM SERPL-SCNC: 139 MMOL/L (ref 136–145)
WBC # BLD AUTO: 10.5 10X3/UL (ref 4.8–10.8)

## 2023-05-01 PROCEDURE — 02HK0KZ INSERTION OF DEFIBRILLATOR LEAD INTO RIGHT VENTRICLE, OPEN APPROACH: ICD-10-PCS | Performed by: INTERNAL MEDICINE

## 2023-05-01 PROCEDURE — 02K83ZZ MAP CONDUCTION MECHANISM, PERCUTANEOUS APPROACH: ICD-10-PCS | Performed by: INTERNAL MEDICINE

## 2023-05-01 PROCEDURE — 02583ZZ DESTRUCTION OF CONDUCTION MECHANISM, PERCUTANEOUS APPROACH: ICD-10-PCS | Performed by: INTERNAL MEDICINE

## 2023-05-01 PROCEDURE — 4A0234Z MEASUREMENT OF CARDIAC ELECTRICAL ACTIVITY, PERCUTANEOUS APPROACH: ICD-10-PCS | Performed by: INTERNAL MEDICINE

## 2023-05-01 PROCEDURE — 4A023FZ MEASUREMENT OF CARDIAC RHYTHM, PERCUTANEOUS APPROACH: ICD-10-PCS | Performed by: INTERNAL MEDICINE

## 2023-05-01 PROCEDURE — 02H60KZ INSERTION OF DEFIBRILLATOR LEAD INTO RIGHT ATRIUM, OPEN APPROACH: ICD-10-PCS | Performed by: INTERNAL MEDICINE

## 2023-05-01 PROCEDURE — 0JH608Z INSERTION OF DEFIBRILLATOR GENERATOR INTO CHEST SUBCUTANEOUS TISSUE AND FASCIA, OPEN APPROACH: ICD-10-PCS | Performed by: INTERNAL MEDICINE

## 2023-05-01 RX ADMIN — SACUBITRIL AND VALSARTAN SCH TAB: 24; 26 TABLET, FILM COATED ORAL at 21:43

## 2023-05-01 RX ADMIN — IPRATROPIUM BROMIDE SCH PUFF: 17 AEROSOL, METERED RESPIRATORY (INHALATION) at 10:20

## 2023-05-01 RX ADMIN — IPRATROPIUM BROMIDE SCH: 17 AEROSOL, METERED RESPIRATORY (INHALATION) at 13:38

## 2023-05-01 RX ADMIN — MOMETASONE FUROATE AND FORMOTEROL FUMARATE DIHYDRATE SCH PUFF: 200; 5 AEROSOL RESPIRATORY (INHALATION) at 18:56

## 2023-05-01 RX ADMIN — IPRATROPIUM BROMIDE SCH PUFF: 17 AEROSOL, METERED RESPIRATORY (INHALATION) at 07:05

## 2023-05-01 RX ADMIN — Medication SCH EACH: at 01:51

## 2023-05-01 RX ADMIN — IPRATROPIUM BROMIDE SCH PUFF: 17 AEROSOL, METERED RESPIRATORY (INHALATION) at 18:56

## 2023-05-01 RX ADMIN — Medication SCH ML: at 21:55

## 2023-05-01 RX ADMIN — SACUBITRIL AND VALSARTAN SCH: 24; 26 TABLET, FILM COATED ORAL at 09:31

## 2023-05-01 RX ADMIN — MOMETASONE FUROATE AND FORMOTEROL FUMARATE DIHYDRATE SCH PUFF: 200; 5 AEROSOL RESPIRATORY (INHALATION) at 07:06

## 2023-05-01 RX ADMIN — Medication SCH ML: at 09:31

## 2023-05-02 LAB
ANION GAP SERPL CALC-SCNC: 17 MMOL/L (ref 10–20)
BASOPHILS # BLD AUTO: 0 THOU/UL (ref 0–0.2)
BASOPHILS NFR BLD AUTO: 0 % (ref 0–1)
BUN SERPL-MCNC: 36 MG/DL (ref 8.4–25.7)
CALCIUM SERPL-MCNC: 8.3 MG/DL (ref 7.8–10.44)
CHLORIDE SERPL-SCNC: 104 MMOL/L (ref 98–107)
CO2 SERPL-SCNC: 22 MMOL/L (ref 23–31)
CREAT CL PREDICTED SERPL C-G-VRATE: 71 ML/MIN (ref 70–130)
EOSINOPHIL # BLD AUTO: 0.1 THOU/UL (ref 0–0.7)
EOSINOPHIL NFR BLD AUTO: 0.6 % (ref 0–10)
GLUCOSE SERPL-MCNC: 114 MG/DL (ref 80–115)
HGB BLD-MCNC: 11.9 G/DL (ref 14–18)
LYMPHOCYTES # BLD: 1.2 THOU/UL (ref 1.2–3.4)
LYMPHOCYTES NFR BLD AUTO: 10.3 % (ref 21–51)
MAGNESIUM SERPL-MCNC: 2.2 MG/DL (ref 1.6–2.6)
MCH RBC QN AUTO: 31.5 PG (ref 27–31)
MCV RBC AUTO: 95.6 FL (ref 78–98)
MONOCYTES # BLD AUTO: 1.1 THOU/UL (ref 0.11–0.59)
MONOCYTES NFR BLD AUTO: 9.2 % (ref 0–10)
NEUTROPHILS # BLD AUTO: 9.2 THOU/UL (ref 1.4–6.5)
NEUTROPHILS NFR BLD AUTO: 79.9 % (ref 42–75)
PLATELET # BLD AUTO: 394 10X3/UL (ref 130–400)
POTASSIUM SERPL-SCNC: 4.2 MMOL/L (ref 3.5–5.1)
RBC # BLD AUTO: 3.79 MILL/UL (ref 4.7–6.1)
SODIUM SERPL-SCNC: 139 MMOL/L (ref 136–145)
WBC # BLD AUTO: 11.5 10X3/UL (ref 4.8–10.8)

## 2023-05-02 RX ADMIN — IPRATROPIUM BROMIDE SCH PUFF: 17 AEROSOL, METERED RESPIRATORY (INHALATION) at 15:19

## 2023-05-02 RX ADMIN — MOMETASONE FUROATE AND FORMOTEROL FUMARATE DIHYDRATE SCH: 200; 5 AEROSOL RESPIRATORY (INHALATION) at 12:24

## 2023-05-02 RX ADMIN — IPRATROPIUM BROMIDE SCH PUFF: 17 AEROSOL, METERED RESPIRATORY (INHALATION) at 19:05

## 2023-05-02 RX ADMIN — Medication SCH ML: at 10:11

## 2023-05-02 RX ADMIN — MOMETASONE FUROATE AND FORMOTEROL FUMARATE DIHYDRATE SCH PUFF: 200; 5 AEROSOL RESPIRATORY (INHALATION) at 19:06

## 2023-05-02 RX ADMIN — IPRATROPIUM BROMIDE SCH: 17 AEROSOL, METERED RESPIRATORY (INHALATION) at 12:24

## 2023-05-02 RX ADMIN — Medication SCH EACH: at 03:41

## 2023-05-02 RX ADMIN — Medication SCH ML: at 21:31

## 2023-05-02 RX ADMIN — IPRATROPIUM BROMIDE SCH PUFF: 17 AEROSOL, METERED RESPIRATORY (INHALATION) at 12:16

## 2023-05-03 VITALS — DIASTOLIC BLOOD PRESSURE: 68 MMHG | SYSTOLIC BLOOD PRESSURE: 156 MMHG | TEMPERATURE: 97.9 F

## 2023-05-03 LAB
ANION GAP SERPL CALC-SCNC: 13 MMOL/L (ref 10–20)
BASOPHILS # BLD AUTO: 0 THOU/UL (ref 0–0.2)
BASOPHILS NFR BLD AUTO: 0.2 % (ref 0–1)
BUN SERPL-MCNC: 36 MG/DL (ref 8.4–25.7)
CALCIUM SERPL-MCNC: 8.3 MG/DL (ref 7.8–10.44)
CHLORIDE SERPL-SCNC: 104 MMOL/L (ref 98–107)
CO2 SERPL-SCNC: 24 MMOL/L (ref 23–31)
CREAT CL PREDICTED SERPL C-G-VRATE: 79 ML/MIN (ref 70–130)
EOSINOPHIL # BLD AUTO: 0.3 THOU/UL (ref 0–0.7)
EOSINOPHIL NFR BLD AUTO: 2.2 % (ref 0–10)
GLUCOSE SERPL-MCNC: 83 MG/DL (ref 80–115)
HGB BLD-MCNC: 11.9 G/DL (ref 14–18)
LYMPHOCYTES # BLD: 1.4 THOU/UL (ref 1.2–3.4)
LYMPHOCYTES NFR BLD AUTO: 12.5 % (ref 21–51)
MAGNESIUM SERPL-MCNC: 2.3 MG/DL (ref 1.6–2.6)
MCH RBC QN AUTO: 31.2 PG (ref 27–31)
MCV RBC AUTO: 95.6 FL (ref 78–98)
MONOCYTES # BLD AUTO: 1 THOU/UL (ref 0.11–0.59)
MONOCYTES NFR BLD AUTO: 8.5 % (ref 0–10)
NEUTROPHILS # BLD AUTO: 8.6 THOU/UL (ref 1.4–6.5)
NEUTROPHILS NFR BLD AUTO: 76.6 % (ref 42–75)
PLATELET # BLD AUTO: 416 10X3/UL (ref 130–400)
POTASSIUM SERPL-SCNC: 4 MMOL/L (ref 3.5–5.1)
RBC # BLD AUTO: 3.81 MILL/UL (ref 4.7–6.1)
SODIUM SERPL-SCNC: 137 MMOL/L (ref 136–145)
WBC # BLD AUTO: 11.2 10X3/UL (ref 4.8–10.8)

## 2023-05-03 RX ADMIN — Medication SCH: at 02:21

## 2023-05-03 RX ADMIN — MOMETASONE FUROATE AND FORMOTEROL FUMARATE DIHYDRATE SCH PUFF: 200; 5 AEROSOL RESPIRATORY (INHALATION) at 07:14

## 2023-05-03 RX ADMIN — IPRATROPIUM BROMIDE SCH PUFF: 17 AEROSOL, METERED RESPIRATORY (INHALATION) at 07:13

## 2023-05-03 RX ADMIN — Medication SCH ML: at 09:59

## 2023-06-13 ENCOUNTER — HOSPITAL ENCOUNTER (EMERGENCY)
Dept: HOSPITAL 18 - NAV ERS | Age: 73
Discharge: HOME | End: 2023-06-13
Payer: MEDICARE

## 2023-06-13 DIAGNOSIS — Z87.891: ICD-10-CM

## 2023-06-13 DIAGNOSIS — S51.812A: Primary | ICD-10-CM

## 2023-06-13 DIAGNOSIS — E78.00: ICD-10-CM
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
## 2023-06-13 DIAGNOSIS — I50.9: ICD-10-CM

## 2023-06-13 DIAGNOSIS — I25.10: ICD-10-CM

## 2023-06-13 DIAGNOSIS — J44.9: ICD-10-CM

## 2023-06-13 PROCEDURE — 99283 EMERGENCY DEPT VISIT LOW MDM: CPT

## 2024-11-22 ENCOUNTER — HOSPITAL ENCOUNTER (OUTPATIENT)
Dept: HOSPITAL 92 - LABBT | Age: 74
Discharge: HOME | End: 2024-11-22
Attending: INTERNAL MEDICINE
Payer: MEDICARE

## 2024-11-22 DIAGNOSIS — I48.0: ICD-10-CM

## 2024-11-22 DIAGNOSIS — Z01.812: Primary | ICD-10-CM

## 2024-11-22 LAB
ANION GAP SERPL CALC-SCNC: 15 MMOL/L (ref 10–20)
APTT PPP: 43.5 SEC (ref 22.9–36.1)
BASOPHILS # BLD AUTO: 0.06 10X3/UL (ref 0–0.2)
BASOPHILS NFR BLD AUTO: 0.5 % (ref 0–1)
BUN SERPL-MCNC: 26 MG/DL (ref 8.4–25.7)
CALCIUM SERPL-MCNC: 9.4 MG/DL (ref 7.8–10.44)
CHLORIDE SERPL-SCNC: 99 MMOL/L (ref 98–107)
CO2 SERPL-SCNC: 26 MMOL/L (ref 23–31)
CREAT CL PREDICTED SERPL C-G-VRATE: 0 ML/MIN (ref 70–130)
EOSINOPHIL # BLD AUTO: 0.5 10X3/UL (ref 0–0.7)
EOSINOPHIL NFR BLD AUTO: 3.8 % (ref 0–10)
GLUCOSE SERPL-MCNC: 88 MG/DL (ref 83–110)
HCT VFR BLD CALC: 43.2 % (ref 42–52)
HGB BLD-MCNC: 13.9 G/DL (ref 14–18)
INR PPP: 1.5
LYMPHOCYTES NFR BLD AUTO: 10.7 % (ref 21–51)
MCH RBC QN AUTO: 30.5 PG (ref 27–31)
MCV RBC AUTO: 94.9 FL (ref 78–98)
MONOCYTES # BLD AUTO: 1 10X3/UL (ref 0.11–0.59)
MONOCYTES NFR BLD AUTO: 7.9 % (ref 0–10)
NEUTROPHILS # BLD AUTO: 10.1 10X3/UL (ref 1.4–6.5)
NEUTROPHILS NFR BLD AUTO: 76.7 % (ref 42–75)
PLATELET # BLD AUTO: 402 10X3/UL (ref 130–400)
POTASSIUM SERPL-SCNC: 4.6 MMOL/L (ref 3.5–5.1)
PROTHROMBIN TIME: 17.7 SEC (ref 12–14.7)
RBC # BLD AUTO: 4.55 MILL/UL (ref 4.7–6.1)
SODIUM SERPL-SCNC: 135 MMOL/L (ref 136–145)
WBC # BLD AUTO: 13.2 10X3/UL (ref 4.8–10.8)

## 2024-11-22 PROCEDURE — 85025 COMPLETE CBC W/AUTO DIFF WBC: CPT

## 2024-11-22 PROCEDURE — 85610 PROTHROMBIN TIME: CPT

## 2024-11-22 PROCEDURE — 85730 THROMBOPLASTIN TIME PARTIAL: CPT

## 2024-11-22 PROCEDURE — 80048 BASIC METABOLIC PNL TOTAL CA: CPT

## 2024-11-26 ENCOUNTER — HOSPITAL ENCOUNTER (OUTPATIENT)
Dept: HOSPITAL 92 - SDC | Age: 74
Discharge: HOME | End: 2024-11-26
Attending: INTERNAL MEDICINE
Payer: MEDICARE

## 2024-11-26 VITALS — BODY MASS INDEX: 22.9 KG/M2

## 2024-11-26 DIAGNOSIS — I73.9: ICD-10-CM

## 2024-11-26 DIAGNOSIS — Z79.82: ICD-10-CM

## 2024-11-26 DIAGNOSIS — I25.10: ICD-10-CM

## 2024-11-26 DIAGNOSIS — J44.9: ICD-10-CM

## 2024-11-26 DIAGNOSIS — I11.0: ICD-10-CM

## 2024-11-26 DIAGNOSIS — Z79.01: ICD-10-CM

## 2024-11-26 DIAGNOSIS — I50.22: ICD-10-CM

## 2024-11-26 DIAGNOSIS — Z79.899: ICD-10-CM

## 2024-11-26 DIAGNOSIS — I25.5: ICD-10-CM

## 2024-11-26 DIAGNOSIS — I48.0: Primary | ICD-10-CM

## 2024-11-26 DIAGNOSIS — Z98.890: ICD-10-CM

## 2024-11-26 DIAGNOSIS — E78.5: ICD-10-CM

## 2024-11-26 PROCEDURE — 5A2204Z RESTORATION OF CARDIAC RHYTHM, SINGLE: ICD-10-PCS | Performed by: INTERNAL MEDICINE

## 2024-11-26 PROCEDURE — 92960 CARDIOVERSION ELECTRIC EXT: CPT
